# Patient Record
Sex: FEMALE | Race: WHITE
[De-identification: names, ages, dates, MRNs, and addresses within clinical notes are randomized per-mention and may not be internally consistent; named-entity substitution may affect disease eponyms.]

---

## 2017-06-22 ENCOUNTER — HOSPITAL ENCOUNTER (INPATIENT)
Dept: HOSPITAL 10 - OBT | Age: 32
LOS: 3 days | Discharge: HOME | End: 2017-06-25
Attending: OBSTETRICS & GYNECOLOGY | Admitting: OBSTETRICS & GYNECOLOGY
Payer: COMMERCIAL

## 2017-06-22 VITALS
HEIGHT: 62 IN | WEIGHT: 210.32 LBS | HEIGHT: 62 IN | WEIGHT: 210.32 LBS | BODY MASS INDEX: 38.7 KG/M2 | BODY MASS INDEX: 38.7 KG/M2

## 2017-06-22 VITALS — HEART RATE: 85 BPM | SYSTOLIC BLOOD PRESSURE: 118 MMHG | DIASTOLIC BLOOD PRESSURE: 65 MMHG

## 2017-06-22 DIAGNOSIS — Z3A.39: ICD-10-CM

## 2017-06-22 DIAGNOSIS — Z23: ICD-10-CM

## 2017-06-22 LAB
ADD SCAN DIFF: NO
APTT BLD: 27.3 SEC (ref 25–35)
BASOPHILS # BLD AUTO: 0 10^3/UL (ref 0–0.1)
BASOPHILS NFR BLD: 0.3 % (ref 0–2)
EOSINOPHIL # BLD: 0.1 10^3/UL (ref 0–0.5)
EOSINOPHIL NFR BLD: 0.6 % (ref 0–7)
ERYTHROCYTE [DISTWIDTH] IN BLOOD BY AUTOMATED COUNT: 14.8 % (ref 11.5–14.5)
HCT VFR BLD CALC: 36.5 % (ref 37–47)
HGB BLD-MCNC: 12.3 G/DL (ref 12–16)
INR PPP: 0.91
LYMPHOCYTES # BLD AUTO: 1.9 10^3/UL (ref 0.8–2.9)
LYMPHOCYTES NFR BLD AUTO: 18.4 % (ref 15–51)
MCH RBC QN AUTO: 30.3 PG (ref 29–33)
MCHC RBC AUTO-ENTMCNC: 33.7 G/DL (ref 32–37)
MCV RBC AUTO: 89.9 FL (ref 82–101)
MONOCYTES # BLD: 0.6 10^3/UL (ref 0.3–0.9)
MONOCYTES NFR BLD: 6.2 % (ref 0–11)
NEUTROPHILS # BLD: 7.5 10^3/UL (ref 1.6–7.5)
NEUTROPHILS NFR BLD AUTO: 73.3 % (ref 39–77)
NRBC # BLD MANUAL: 0 10^3/UL (ref 0–0)
NRBC BLD QL: 0 /100WBC (ref 0–0)
PLATELET # BLD: 223 10^3/UL (ref 140–415)
PMV BLD AUTO: 11.1 FL (ref 7.4–10.4)
PROTHROMBIN TIME: 12.3 SEC (ref 12.2–14.2)
PT RATIO: 1
RBC # BLD AUTO: 4.06 10^6/UL (ref 4.2–5.4)
WBC # BLD AUTO: 10.2 10^3/UL (ref 4.8–10.8)

## 2017-06-22 PROCEDURE — 85025 COMPLETE CBC W/AUTO DIFF WBC: CPT

## 2017-06-22 PROCEDURE — 90715 TDAP VACCINE 7 YRS/> IM: CPT

## 2017-06-22 PROCEDURE — 85610 PROTHROMBIN TIME: CPT

## 2017-06-22 PROCEDURE — 86900 BLOOD TYPING SEROLOGIC ABO: CPT

## 2017-06-22 PROCEDURE — 87340 HEPATITIS B SURFACE AG IA: CPT

## 2017-06-22 PROCEDURE — 86592 SYPHILIS TEST NON-TREP QUAL: CPT

## 2017-06-22 PROCEDURE — 86901 BLOOD TYPING SEROLOGIC RH(D): CPT

## 2017-06-22 PROCEDURE — 85730 THROMBOPLASTIN TIME PARTIAL: CPT

## 2017-06-22 PROCEDURE — G0463 HOSPITAL OUTPT CLINIC VISIT: HCPCS

## 2017-06-22 PROCEDURE — 62319: CPT

## 2017-06-22 RX ADMIN — PYRIDOXINE HYDROCHLORIDE SCH MLS/HR: 100 INJECTION, SOLUTION INTRAMUSCULAR; INTRAVENOUS at 11:51

## 2017-06-22 RX ADMIN — AMPICILLIN SODIUM SCH MLS/HR: 1 INJECTION, POWDER, FOR SOLUTION INTRAMUSCULAR; INTRAVENOUS at 20:40

## 2017-06-22 RX ADMIN — PYRIDOXINE HYDROCHLORIDE SCH MLS/HR: 100 INJECTION, SOLUTION INTRAMUSCULAR; INTRAVENOUS at 18:51

## 2017-06-22 RX ADMIN — AMPICILLIN SODIUM SCH MLS/HR: 1 INJECTION, POWDER, FOR SOLUTION INTRAMUSCULAR; INTRAVENOUS at 17:20

## 2017-06-22 NOTE — HP
Date/Time of Note


Date/Time of Note


DATE: 17 


TIME: 19:31





OB - History


Hx of Present Pregnancy


Chief Complaint:  contractions


Estimated Due Date:  2017


:  2


Para:  1


Spontaneous :  0


Therapeutic :  0


Prenatal Care:  Good Care


Ultrasounds:  Normal mid trimester US


Obstetrical Complications:  None


Medical Complications:  None





Past Family/Social History


*


Past Medical, Surgical, Family and Obstetric Histories reviewed from prenatal 

chart.


GBS Status:  Positive





OB  Admission Exam


Vital Signs


Vital Signs





 Vital Signs








  Date Time  Temp Pulse Resp B/P Pulse Ox O2 Delivery O2 Flow Rate FiO2


 


17 10:55 97.4 85  118/65    











Physical Exam


HEENT:  WNL


Heart:  Rhythm Normal


Lungs:  Clear


Abdomen:  WNL


Extremities:  Normal


Cervical Dilatation:  4cm


Effacement:  50%


Station:  -2


Membranes:  Intact


Fetal Heart Rate:  130's


Accelerations:  Accelerations Present


Decelerations:  No Decelerations


Varibility:  Moderate


Last 72 hours Lab Results


 CBC & BMP


17 11:38











OB  Assessment/Plan


Reason for admission:  active labor


Plan:  Expectant Management











YENI CABA MD 2017 19:33

## 2017-06-23 VITALS — SYSTOLIC BLOOD PRESSURE: 116 MMHG | DIASTOLIC BLOOD PRESSURE: 58 MMHG | RESPIRATION RATE: 18 BRPM | HEART RATE: 82 BPM

## 2017-06-23 RX ADMIN — PYRIDOXINE HYDROCHLORIDE SCH MLS/HR: 100 INJECTION, SOLUTION INTRAMUSCULAR; INTRAVENOUS at 17:23

## 2017-06-23 RX ADMIN — AMPICILLIN SODIUM SCH MLS/HR: 1 INJECTION, POWDER, FOR SOLUTION INTRAMUSCULAR; INTRAVENOUS at 05:15

## 2017-06-23 RX ADMIN — FENTANYL CIT 0.2 MG/100ML-ROPIV 0.2%-NACL 0.9% EPIDURAL INJ SCH ML: 2/0.2 SOLUTION at 02:40

## 2017-06-23 RX ADMIN — AMPICILLIN SODIUM SCH MLS/HR: 1 INJECTION, POWDER, FOR SOLUTION INTRAMUSCULAR; INTRAVENOUS at 13:22

## 2017-06-23 RX ADMIN — PYRIDOXINE HYDROCHLORIDE SCH MLS/HR: 100 INJECTION, SOLUTION INTRAMUSCULAR; INTRAVENOUS at 08:55

## 2017-06-23 RX ADMIN — PYRIDOXINE HYDROCHLORIDE SCH MLS/HR: 100 INJECTION, SOLUTION INTRAMUSCULAR; INTRAVENOUS at 22:17

## 2017-06-23 RX ADMIN — FENTANYL CIT 0.2 MG/100ML-ROPIV 0.2%-NACL 0.9% EPIDURAL INJ SCH ML: 2/0.2 SOLUTION at 13:32

## 2017-06-23 RX ADMIN — ACETAMINOPHEN AND CODEINE PHOSPHATE PRN TAB: 30; 300 TABLET ORAL at 23:01

## 2017-06-23 RX ADMIN — AMPICILLIN SODIUM SCH MLS/HR: 1 INJECTION, POWDER, FOR SOLUTION INTRAMUSCULAR; INTRAVENOUS at 17:24

## 2017-06-23 RX ADMIN — AMPICILLIN SODIUM SCH MLS/HR: 1 INJECTION, POWDER, FOR SOLUTION INTRAMUSCULAR; INTRAVENOUS at 08:54

## 2017-06-23 RX ADMIN — PYRIDOXINE HYDROCHLORIDE SCH MLS/HR: 100 INJECTION, SOLUTION INTRAMUSCULAR; INTRAVENOUS at 01:18

## 2017-06-23 RX ADMIN — AMPICILLIN SODIUM SCH MLS/HR: 1 INJECTION, POWDER, FOR SOLUTION INTRAMUSCULAR; INTRAVENOUS at 00:45

## 2017-06-23 NOTE — LDN
Date/Time of Note


Date/Time of Note


DATE: 17 


TIME: 20:47





Delivery Summary





Weeks of Gestation


39 weeks


Placenta Delivered:  Spontaneously


Meconium:  none


Episiotomy:  No


Perineal laceration:  0


Anesthesia type:  Epidural


Estimated blood loss:  200


Sponge & Needle done & correct:  Yes


All needle counts correct:  Yes


Any foreign bodies felt in the:  No


Problems:  





Infant Delivery Information


Sex


Infant Sex:  male





Apgars


1 Minute:  9


5 Minute:  9





Suctioning


Nose & mouth suctioned at eduardo:  Yes


Delee suction performed:  No





Umbilical Cord


Umbilical cord with:  3 Vessels


Cord presentations:  no nuchal cord


Cord Blood was obtained:  Yes





Mother & Baby Disposition


Disposition


Mom & Baby to Maternity; Good:  Yes











YENI CABA MD 2017 20:51

## 2017-06-24 VITALS — RESPIRATION RATE: 18 BRPM | SYSTOLIC BLOOD PRESSURE: 123 MMHG | HEART RATE: 81 BPM | DIASTOLIC BLOOD PRESSURE: 61 MMHG

## 2017-06-24 VITALS — DIASTOLIC BLOOD PRESSURE: 75 MMHG | HEART RATE: 74 BPM | SYSTOLIC BLOOD PRESSURE: 118 MMHG | RESPIRATION RATE: 18 BRPM

## 2017-06-24 VITALS — RESPIRATION RATE: 18 BRPM | HEART RATE: 89 BPM | SYSTOLIC BLOOD PRESSURE: 112 MMHG | DIASTOLIC BLOOD PRESSURE: 60 MMHG

## 2017-06-24 VITALS — DIASTOLIC BLOOD PRESSURE: 54 MMHG | HEART RATE: 71 BPM | SYSTOLIC BLOOD PRESSURE: 108 MMHG | RESPIRATION RATE: 18 BRPM

## 2017-06-24 LAB
ADD SCAN DIFF: NO
BASOPHILS # BLD AUTO: 0 10^3/UL (ref 0–0.1)
BASOPHILS NFR BLD: 0.2 % (ref 0–2)
EOSINOPHIL # BLD: 0 10^3/UL (ref 0–0.5)
EOSINOPHIL NFR BLD: 0.3 % (ref 0–7)
ERYTHROCYTE [DISTWIDTH] IN BLOOD BY AUTOMATED COUNT: 15.2 % (ref 11.5–14.5)
HCT VFR BLD CALC: 35 % (ref 37–47)
HGB BLD-MCNC: 11.4 G/DL (ref 12–16)
LYMPHOCYTES # BLD AUTO: 1.8 10^3/UL (ref 0.8–2.9)
LYMPHOCYTES NFR BLD AUTO: 14 % (ref 15–51)
MCH RBC QN AUTO: 29.8 PG (ref 29–33)
MCHC RBC AUTO-ENTMCNC: 32.6 G/DL (ref 32–37)
MCV RBC AUTO: 91.4 FL (ref 82–101)
MONOCYTES # BLD: 0.8 10^3/UL (ref 0.3–0.9)
MONOCYTES NFR BLD: 5.9 % (ref 0–11)
NEUTROPHILS # BLD: 10.3 10^3/UL (ref 1.6–7.5)
NEUTROPHILS NFR BLD AUTO: 79 % (ref 39–77)
NRBC # BLD MANUAL: 0 10^3/UL (ref 0–0)
NRBC BLD QL: 0 /100WBC (ref 0–0)
PLATELET # BLD: 212 10^3/UL (ref 140–415)
PMV BLD AUTO: 11.6 FL (ref 7.4–10.4)
RBC # BLD AUTO: 3.83 10^6/UL (ref 4.2–5.4)
WBC # BLD AUTO: 13 10^3/UL (ref 4.8–10.8)

## 2017-06-24 RX ADMIN — DOCUSATE SODIUM AND SENNOSIDES SCH TAB: 8.6; 5 TABLET, FILM COATED ORAL at 13:01

## 2017-06-24 RX ADMIN — IBUPROFEN SCH MG: 600 TABLET ORAL at 05:25

## 2017-06-24 RX ADMIN — IBUPROFEN SCH MG: 600 TABLET ORAL at 00:00

## 2017-06-24 RX ADMIN — DOCUSATE SODIUM AND SENNOSIDES SCH TAB: 8.6; 5 TABLET, FILM COATED ORAL at 21:00

## 2017-06-24 RX ADMIN — PYRIDOXINE HYDROCHLORIDE SCH MLS/HR: 100 INJECTION, SOLUTION INTRAMUSCULAR; INTRAVENOUS at 14:17

## 2017-06-24 RX ADMIN — PYRIDOXINE HYDROCHLORIDE SCH MLS/HR: 100 INJECTION, SOLUTION INTRAMUSCULAR; INTRAVENOUS at 06:17

## 2017-06-24 RX ADMIN — IBUPROFEN SCH MG: 600 TABLET ORAL at 18:22

## 2017-06-24 RX ADMIN — PYRIDOXINE HYDROCHLORIDE SCH MLS/HR: 100 INJECTION, SOLUTION INTRAMUSCULAR; INTRAVENOUS at 22:17

## 2017-06-24 RX ADMIN — ACETAMINOPHEN AND CODEINE PHOSPHATE PRN TAB: 30; 300 TABLET ORAL at 03:34

## 2017-06-24 RX ADMIN — IBUPROFEN SCH MG: 600 TABLET ORAL at 13:02

## 2017-06-24 RX ADMIN — IBUPROFEN SCH MG: 600 TABLET ORAL at 23:43

## 2017-06-24 RX ADMIN — DOCUSATE SODIUM AND SENNOSIDES SCH TAB: 8.6; 5 TABLET, FILM COATED ORAL at 10:34

## 2017-06-24 NOTE — QN
Documentation


Comment


No complaint


Afebrile VSS


Fundus Firm


Lochia Scant


PPD #1


Stable


Continue with present care.











YENI CABA MD Jun 24, 2017 15:33

## 2017-06-25 VITALS — HEART RATE: 72 BPM | DIASTOLIC BLOOD PRESSURE: 62 MMHG | RESPIRATION RATE: 20 BRPM | SYSTOLIC BLOOD PRESSURE: 124 MMHG

## 2017-06-25 VITALS — HEART RATE: 74 BPM | RESPIRATION RATE: 18 BRPM | DIASTOLIC BLOOD PRESSURE: 68 MMHG | SYSTOLIC BLOOD PRESSURE: 114 MMHG

## 2017-06-25 VITALS — HEART RATE: 70 BPM | DIASTOLIC BLOOD PRESSURE: 62 MMHG | SYSTOLIC BLOOD PRESSURE: 119 MMHG | RESPIRATION RATE: 18 BRPM

## 2017-06-25 PROCEDURE — 3E00X4Z INTRODUCTION OF SERUM, TOXOID AND VACCINE INTO SKIN AND MUCOUS MEMBRANES, EXTERNAL APPROACH: ICD-10-PCS | Performed by: OBSTETRICS & GYNECOLOGY

## 2017-06-25 RX ADMIN — DOCUSATE SODIUM AND SENNOSIDES SCH TAB: 8.6; 5 TABLET, FILM COATED ORAL at 10:01

## 2017-06-25 RX ADMIN — IBUPROFEN SCH MG: 600 TABLET ORAL at 12:38

## 2017-06-25 RX ADMIN — IBUPROFEN SCH MG: 600 TABLET ORAL at 05:23

## 2017-06-25 RX ADMIN — IBUPROFEN SCH MG: 600 TABLET ORAL at 18:00

## 2017-06-25 RX ADMIN — PYRIDOXINE HYDROCHLORIDE SCH MLS/HR: 100 INJECTION, SOLUTION INTRAMUSCULAR; INTRAVENOUS at 06:17

## 2017-06-25 NOTE — DS
Date/Time of Note


Date/Time of Note


DATE: 6/25/17 


TIME: 15:58





Obstetrical Discharge Record


Final Diagnosis


Final Diagnosis:  Term delivered





Vaginal Delivery


Obstetrical Delivery:  Spontaneous





Condition on Discharge


Physical Assessment


Voiding:  Yes


Bowel Movement:  Yes


Breast:  Soft, non-tender


Fundus:  Firm


Calf Tenderness:  No


Patient Condition:  Stable











YENI CABA MD Jun 25, 2017 15:59

## 2017-07-14 ENCOUNTER — HOSPITAL ENCOUNTER (EMERGENCY)
Dept: HOSPITAL 10 - FTE | Age: 32
Discharge: HOME | End: 2017-07-14
Payer: COMMERCIAL

## 2017-07-14 VITALS
WEIGHT: 196.21 LBS | HEIGHT: 67 IN | WEIGHT: 196.21 LBS | BODY MASS INDEX: 30.8 KG/M2 | BODY MASS INDEX: 30.8 KG/M2 | HEIGHT: 67 IN

## 2017-07-14 DIAGNOSIS — R10.2: ICD-10-CM

## 2017-07-14 DIAGNOSIS — N39.0: Primary | ICD-10-CM

## 2017-07-14 LAB
ADD SCAN DIFF: NO
ADD UMIC: YES
ALBUMIN SERPL-MCNC: 4.7 G/DL (ref 3.3–4.9)
ALBUMIN/GLOB SERPL: 1.46 {RATIO}
ALP SERPL-CCNC: 140 IU/L (ref 42–121)
ALT SERPL-CCNC: 91 IU/L (ref 13–69)
ANION GAP SERPL CALC-SCNC: 21 MMOL/L (ref 8–16)
AST SERPL-CCNC: 48 IU/L (ref 15–46)
BACTERIA #/AREA URNS HPF: (no result) /HPF
BASOPHILS # BLD AUTO: 0.1 10^3/UL (ref 0–0.1)
BASOPHILS NFR BLD: 0.4 % (ref 0–2)
BILIRUB DIRECT SERPL-MCNC: 0 MG/DL (ref 0–0.2)
BILIRUB SERPL-MCNC: 0.2 MG/DL (ref 0.2–1.3)
BUN SERPL-MCNC: 14 MG/DL (ref 7–20)
CALCIUM SERPL-MCNC: 9.7 MG/DL (ref 8.4–10.2)
CHLORIDE SERPL-SCNC: 104 MMOL/L (ref 97–110)
CO2 SERPL-SCNC: 23 MMOL/L (ref 21–31)
COLOR UR: YELLOW
CREAT SERPL-MCNC: 0.83 MG/DL (ref 0.44–1)
EOSINOPHIL # BLD: 0.1 10^3/UL (ref 0–0.5)
EOSINOPHIL NFR BLD: 0.4 % (ref 0–7)
ERYTHROCYTE [DISTWIDTH] IN BLOOD BY AUTOMATED COUNT: 13.9 % (ref 11.5–14.5)
GLOBULIN SER-MCNC: 3.2 G/DL (ref 1.3–3.2)
GLUCOSE SERPL-MCNC: 109 MG/DL (ref 70–220)
GLUCOSE UR STRIP-MCNC: NEGATIVE MG/DL
HCT VFR BLD CALC: 42.3 % (ref 37–47)
HGB BLD-MCNC: 14.1 G/DL (ref 12–16)
KETONES UR STRIP.AUTO-MCNC: NEGATIVE MG/DL
LYMPHOCYTES # BLD AUTO: 2 10^3/UL (ref 0.8–2.9)
LYMPHOCYTES NFR BLD AUTO: 15.1 % (ref 15–51)
MCH RBC QN AUTO: 30.1 PG (ref 29–33)
MCHC RBC AUTO-ENTMCNC: 33.3 G/DL (ref 32–37)
MCV RBC AUTO: 90.2 FL (ref 82–101)
MONOCYTES # BLD: 0.6 10^3/UL (ref 0.3–0.9)
MONOCYTES NFR BLD: 4.9 % (ref 0–11)
MUCOUS THREADS #/AREA URNS HPF: (no result) /HPF
NEUTROPHILS # BLD: 10.2 10^3/UL (ref 1.6–7.5)
NEUTROPHILS NFR BLD AUTO: 78.8 % (ref 39–77)
NITRITE UR QL STRIP.AUTO: NEGATIVE MG/DL
NRBC # BLD MANUAL: 0 10^3/UL (ref 0–0)
NRBC BLD QL: 0 /100WBC (ref 0–0)
PLATELET # BLD: 238 10^3/UL (ref 140–415)
PMV BLD AUTO: 11.8 FL (ref 7.4–10.4)
POTASSIUM SERPL-SCNC: 4.1 MMOL/L (ref 3.5–5.1)
PROT SERPL-MCNC: 7.9 G/DL (ref 6.1–8.1)
RBC # BLD AUTO: 4.69 10^6/UL (ref 4.2–5.4)
RBC # UR AUTO: (no result) MG/DL
SODIUM SERPL-SCNC: 144 MMOL/L (ref 135–144)
SQUAMOUS #/AREA URNS HPF: (no result) /HPF
UR ASCORBIC ACID: NEGATIVE MG/DL
UR BILIRUBIN (DIP): NEGATIVE MG/DL
UR CLARITY: (no result)
UR NONSQUAMOUS EPITHELIAL CELL: 4 /HPF
UR PH (DIP): 5 (ref 5–9)
UR RBC: > 182 /HPF (ref 0–5)
UR SPECIFIC GRAVITY (DIP): 1.02 (ref 1–1.03)
UR TOTAL PROTEIN (DIP): (no result) MG/DL
UR WBC CLUMPS: (no result) /HPF
UROBILINOGEN UR STRIP-ACNC: NEGATIVE MG/DL
WBC # BLD AUTO: 13 10^3/UL (ref 4.8–10.8)
WBC # UR STRIP: (no result) LEU/UL

## 2017-07-14 PROCEDURE — 85025 COMPLETE CBC W/AUTO DIFF WBC: CPT

## 2017-07-14 PROCEDURE — 76830 TRANSVAGINAL US NON-OB: CPT

## 2017-07-14 PROCEDURE — 80053 COMPREHEN METABOLIC PANEL: CPT

## 2017-07-14 PROCEDURE — 76856 US EXAM PELVIC COMPLETE: CPT

## 2017-07-14 PROCEDURE — 83690 ASSAY OF LIPASE: CPT

## 2017-07-14 PROCEDURE — 36415 COLL VENOUS BLD VENIPUNCTURE: CPT

## 2017-07-14 PROCEDURE — 81001 URINALYSIS AUTO W/SCOPE: CPT

## 2017-07-14 NOTE — RADRPT
PROCEDURE:   US Pelvis. 

 

CLINICAL INDICATION:   Left pelvic pain. 

 

TECHNIQUE:   The pelvis was evaluated with transabdominal and transvaginal sonography in the axial a
nd sagittal planes. 

 

COMPARISON:   No prior study is available for comparison.  

 

FINDINGS:

Uterus: 10.3 x 5.7 x 7.1 cm.

Endometrium: 5.6 mm. A small amount of fluid is present in the endometrial canal

Right ovary: 3.2 x 2.2 x 2.1 cm.

Left ovary: 2.9 x 1.0 x 2.0 cm.

 

Uterine masses: None.  

Ovarian masses: None. Color Doppler and pulsed Doppler sonography demonstrate normal flow to the ova
rohit.  

Other pelvic masses: None. 

Free fluid: None.   

 

IMPRESSION:

1.  Small amount of fluid in the endometrial canal.  Benign.  Follow-up advised.

2.  Otherwise normal pelvic ultrasound.  

 

RPTAT: QQ

_____________________________________________ 

.Dion Woods MD, MD           Date    Time 

Electronically viewed and signed by .Dion Woods MD, MD on 07/14/2017 15:17 

 

D:  07/14/2017 15:17  T:  07/14/2017 15:17

.R/

## 2017-07-14 NOTE — ERD
ER Documentation


Chief Complaint


Date/Time


DATE: 17 


TIME: 16:26


Chief Complaint


intermittent left side abd pain with nausea x this am





HPI


31-year-old female patient who is a  presents to the ED with left-sided 

upper abdominal pain and feels like it travels to her "vagina".  States that 

this happened yesterday.  Reports that she just vaginally delivered her baby 3 

weeks ago by Dr. Mason.  States that she has been having consistent vaginal 

bleeding and has been changing 3-4 pads per day.  Patient does describe this 

pain as a burning sensation.  Denies any weakness, fatigue, dizziness, nausea, 

vomiting, diarrhea, constipation, vaginal discharge, dysuria, urgency, 

frequency.





ROS


All systems reviewed and are negative except as per history of present illness.





Medications


Home Meds


Active Scripts


Cephalexin* (Keflex*) 500 Mg Capsule, 500 MG PO QID for 7 Days, CAP


   Prov:MAGY DIAZ PA-C         17


Reported Medications


Multivit/Min/Fol Ac/Iron/Pren* (Prenatal S*) 1 Tab Tab, 1 TAB PO DAILY, TAB


   17





Allergies


Allergies:  


Coded Allergies:  


     No Known Allergy (Unverified , 17)





PMhx/Soc


Medical and Surgical Hx:  pt denies Medical Hx, pt denies Surgical Hx


History of Surgery:  No


Anesthesia Reaction:  No


Hx Neurological Disorder:  No


Hx Respiratory Disorders:  No


Hx Cardiac Disorders:  No


Hx Psychiatric Problems:  No


Hx Alcohol Use:  No


Hx Substance Use:  No


Hx Tobacco Use:  No


Smoking Status:  Never smoker





Physical Exam


Vitals


Vital Signs








  Date Time  Temp Pulse Resp B/P Pulse Ox O2 Delivery O2 Flow Rate FiO2


 


17 13:09 97.4 86 18 130/81 98   








Physical Exam


Const: Non-ill-appearing, well-nourished. In no acute distress.


Head: Atraumatic, normocephalic 


Eyes: Normal Conjunctiva without injection. No purulent discharge. 


ENT: Normal external ear, nose. Moist oropharynx without tonsillar exudates. Non

-erythematous pharynx. Uvula midline. No drooling.  No trismus. 


Neck: No cervical midline tenderness.  Full range of motion. No meningismus. No 

cervical lymphadenopathy. No JVD.


Resp: Clear to auscultation bilaterally. No wheezing, rhonchi, rales, or 

crackles. No accessory muscle use. No retractions.


Cardio: Regular rate and rhythm.  No murmurs, rubs or gallops.


Abd: Soft, left upper quadrant tenderness, non distended. Normal bowel sounds.  

No palpable masses.  No rebound tenderness.  No guarding.  Negative McBurney's 

point. Negative psoas sign. Negative obturator sign.


Skin: No petechiae or rashes


Back: No midline tenderness. No CVA tenderness.


Ext: No cyanosis, or edema.


Neur: Awake and alert. Normal gait. Normal coordination. 


Psych: Normal Mood and Affect


Results 24 hrs





 Laboratory Tests








Test


  17


14:19


 


White Blood Count 13.010^3/ul 


 


Red Blood Count 4.6910^6/ul 


 


Hemoglobin 14.1g/dl 


 


Hematocrit 42.3% 


 


Mean Corpuscular Volume 90.2fl 


 


Mean Corpuscular Hemoglobin 30.1pg 


 


Mean Corpuscular Hemoglobin


Concent 33.3g/dl 


 


 


Red Cell Distribution Width 13.9% 


 


Platelet Count 71044^3/UL 


 


Mean Platelet Volume 11.8fl 


 


Neutrophils % 78.8% 


 


Lymphocytes % 15.1% 


 


Monocytes % 4.9% 


 


Eosinophils % 0.4% 


 


Basophils % 0.4% 


 


Nucleated Red Blood Cells % 0.0/100WBC 


 


Neutrophils # 10.210^3/ul 


 


Lymphocytes # 2.010^3/ul 


 


Monocytes # 0.610^3/ul 


 


Eosinophils # 0.110^3/ul 


 


Basophils # 0.110^3/ul 


 


Nucleated Red Blood Cells # 0.010^3/ul 


 


Urine Color YELLOW 


 


Urine Clarity CLOUDY 


 


Urine pH 5.0 


 


Urine Specific Gravity 1.025 


 


Urine Ketones NEGATIVEmg/dL 


 


Urine Nitrite NEGATIVEmg/dL 


 


Urine Bilirubin NEGATIVEmg/dL 


 


Urine Urobilinogen NEGATIVEmg/dL 


 


Urine Leukocyte Esterase 3+Ryne/ul 


 


Urine Microscopic RBC > 182/HPF 


 


Urine Microscopic WBC > 182/HPF 


 


Urine Squamous Epithelial


Cells FEW/HPF 


 


 


Urine Bacteria FEW/HPF 


 


Urine Mucus FEW/HPF 


 


Urine Hemoglobin 3+mg/dL 


 


Urine Glucose NEGATIVEmg/dL 


 


Urine Total Protein 2+mg/dl 


 


Sodium Level 144mmol/L 


 


Potassium Level 4.1mmol/L 


 


Chloride Level 104mmol/L 


 


Carbon Dioxide Level 23mmol/L 


 


Anion Gap 21 


 


Blood Urea Nitrogen 14mg/dl 


 


Creatinine 0.83mg/dl 


 


Glucose Level 109mg/dl 


 


Calcium Level 9.7mg/dl 


 


Total Bilirubin 0.2mg/dl 


 


Direct Bilirubin 0.00mg/dl 


 


Indirect Bilirubin 0.2mg/dl 


 


Aspartate Amino Transf


(AST/SGOT) 48IU/L 


 


 


Alanine Aminotransferase


(ALT/SGPT) 91IU/L 


 


 


Alkaline Phosphatase 140IU/L 


 


Total Protein 7.9g/dl 


 


Albumin 4.7g/dl 


 


Globulin 3.20g/dl 


 


Albumin/Globulin Ratio 1.46 


 


Lipase 103U/L 








 Current Medications








 Medications


  (Trade)  Dose


 Ordered  Sig/Shweta


 Route


 PRN Reason  Start Time


 Stop Time Status Last Admin


Dose Admin


 


 Ondansetron HCl


  (Zofran Odt)  4 mg  ONCE  STAT


 ODT


   17 14:00


 17 14:02 DC 17 14:25


 


 


 Acetaminophen


  (Tylenol Tab)  500 mg  ONCE  STAT


 PO


   17 15:56


 17 15:57 DC 17 16:13


 











Procedures/MDM


31-year-old female patient with no significant past medical history presents to 

the ED complaining of left sided upper abdominal pain.  Patient is afebrile and 

nontoxic-appearing.  Patient has normal vital signs.  Patient was further 

worked up with CBC, CMP, lipase, UA, urine pregnancy, pelvic ultrasound. Patient

's pain and symptoms have improved after treatment with Tylenol.





CBC:  No leukocytosis. No e/o of systemic infection. No e/o anemia.


CMP: No e/o severe acidosis, alkalosis, renal failure, diabetic ketoacidosis, 

liver disease


Lipase within normal limits.


Urine: No leukocyte esterase, no nitrites, no hematuria. 


Urine pregnancy: Negative





PROCEDURE:   US Pelvis. 


 


CLINICAL INDICATION:   Left pelvic pain. 


 


TECHNIQUE:   The pelvis was evaluated with transabdominal and transvaginal 

sonography in the axial and sagittal planes. 


 


COMPARISON:   No prior study is available for comparison.  


 


FINDINGS:


Uterus: 10.3 x 5.7 x 7.1 cm.


Endometrium: 5.6 mm. A small amount of fluid is present in the endometrial canal


Right ovary: 3.2 x 2.2 x 2.1 cm.


Left ovary: 2.9 x 1.0 x 2.0 cm.


 


Uterine masses: None.  


Ovarian masses: None. Color Doppler and pulsed Doppler sonography demonstrate 

normal flow to the ovaries.  


Other pelvic masses: None. 


Free fluid: None.   


 


IMPRESSION:


1.  Small amount of fluid in the endometrial canal.  Benign.  Follow-up advised.


2.  Otherwise normal pelvic ultrasound.  


 


Patient has 3+ leukocyte esterase, 182 white blood cells, 3+ hematuria.  

Patient likely has urinary tract infection.  Patient does not have 

hypertension.  Slight liver enzymes are elevated however there is low suspicion 

for any HELLP, postpartum preeclampsia, gastritis, GERD, peptic ulcer disease, 

cholecystitis, choledocholithiasis, cholangitis, pancreatitis, appendicitis, 

bowel obstruction, ileus, volvulus, nephrolithiasis, pyelonephritis, hepatitis, 

perforated viscus, diverticulitis, abdominal hernia, acute abdomen, mesenteric 

ischemia or other emergent conditions.  This case was discussed with my 

supervising physician Dr. Tenorio who agreed with the management and 

discharge plan. 





Discharge medications: Keflex


Follow up with primary care physician in 1-2 days for referral to 

gastroenterologist. Instructed patient to return to the ED sooner for any 

worsening symptoms. Patient's questions were answered. Patient understood and 

agreed with discharge plan. Patient discharged stable.





Departure


Diagnosis:  


 Primary Impression:  


 Urinary tract infection


 Urinary tract infection type:  site unspecified  Hematuria presence:  without 

hematuria  Qualified Code:  N39.0 - Urinary tract infection without hematuria, 

site unspecified


Condition:  Stable


Patient Instructions:  Abdominal Pain, Understanding Urinary Tract Infections (

UTIs)


Referrals:  


[facility]


YOU HAVE RECEIVED A MEDICAL SCREENING EXAM AND THE RESULTS INDICATE THAT YOU DO 

NOT HAVE A CONDITION THAT REQUIRES URGENT TREATMENT IN THE EMERGENCY DEPARTMENT.





FURTHER EVALUATION AND TREATMENT OF YOUR CONDITION CAN WAIT UNTIL YOU ARE SEEN 

IN YOUR DOCTORS OFFICE WITHIN THE NEXT 1-2 DAYS. IT IS YOUR RESPONSIBILITY TO 

MAKE AN APPOINTMENT FOR FOLOW-UP CARE.





IF YOU HAVE A PRIMARY DOCTOR


--you should call your primary doctor and schedule an appointment





IF YOU DO NOT HAVE A PRIMARY DOCTOR YOU CAN CALL OUR PHYSICIAN REFERRAL HOTLINE 

AT


 [phone] 





IF YOU CAN NOT AFFORD TO SEE A PHYSICIAN YOU CAN CHOSE FROM THE FOLLOWING 

[facility]





[facility] [phone] [address] [facility] [phone] [address] [facility]. [facility] [phone] [address] [facility] [phone] [address] [facility]. [facility] [phone] [address] [facility]. [facility]. [phone]


[address] [facility]. [facility]


YOU HAVE RECEIVED A MEDICAL SCREENING EXAM AND THE RESULTS INDICATE THAT YOU DO 

NOT HAVE A CONDITION THAT REQUIRES URGENT TREATMENT IN THE EMERGENCY DEPARTMENT.





FURTHER EVALUATION AND TREATMENT OF YOUR CONDITION CAN WAIT UNTIL YOU ARE SEEN 

IN YOUR DOCTORS OFFICE WITHIN THE NEXT 1-2 DAYS. IT IS YOUR RESPONSIBILITY TO 

MAKE AN APPOINTMENT FOR FOLOW-UP CARE.





IF YOU HAVE A PRIMARY DOCTOR


--you should call your primary doctor and schedule and appointment





IF YOU DO NOT HAVE A PRIMARY DOCTOR YOU CAN CALL OUR PHYSICIAN REFERRAL HOTLINE 

AT [phone].





IF YOU CAN NOT AFFORD TO SEE A PHYSICIAN YOU CAN CHOSE FROM THE FOLLOWING 

[facility] INSTITUTIONS:





[facility]


[address] [place] [address]





[facility]


[address] [place], [place] [id number] [place] [address]


[address] [place] [address]





[facility] URGENT CARE/SPECIALTIES





Additional Instructions:  


Call your primary care doctor for an appointment during the next 2 days for 

further evaluation and treatment.See the doctor sooner or return here if your 

condition worsens before your appointment time.











MAGY DIAZ PA-C 2017 16:29











MAGY DIAZ PA-C 2017 16:29

## 2018-08-24 ENCOUNTER — HOSPITAL ENCOUNTER (INPATIENT)
Dept: HOSPITAL 91 - OBT | Age: 33
LOS: 2 days | Discharge: HOME | End: 2018-08-26
Payer: COMMERCIAL

## 2018-08-24 ENCOUNTER — HOSPITAL ENCOUNTER (INPATIENT)
Age: 33
LOS: 2 days | Discharge: HOME | End: 2018-08-26

## 2018-08-24 DIAGNOSIS — Z3A.38: ICD-10-CM

## 2018-08-24 LAB
ADD MAN DIFF?: NO
BASOPHIL #: 0 10^3/UL (ref 0–0.1)
BASOPHILS %: 0.2 % (ref 0–2)
EOSINOPHILS #: 0.1 10^3/UL (ref 0–0.5)
EOSINOPHILS %: 0.9 % (ref 0–7)
HEMATOCRIT: 38.8 % (ref 37–47)
HEMOGLOBIN: 12.8 G/DL (ref 12–16)
HEPATITIS B SURFACE ANTIGEN: NEGATIVE
IMMATURE GRANS #M: 0.09 10^3/UL (ref 0–0.03)
IMMATURE GRANS % (M): 0.9 % (ref 0–0.43)
INR: 0.87
LYMPHOCYTES #: 2.5 10^3/UL (ref 0.8–2.9)
LYMPHOCYTES %: 25.3 % (ref 15–51)
MEAN CORPUSCULAR HEMOGLOBIN: 30 PG (ref 29–33)
MEAN CORPUSCULAR HGB CONC: 33 G/DL (ref 32–37)
MEAN CORPUSCULAR VOLUME: 90.9 FL (ref 82–101)
MEAN PLATELET VOLUME: 11.1 FL (ref 7.4–10.4)
MONOCYTE #: 0.8 10^3/UL (ref 0.3–0.9)
MONOCYTES %: 7.8 % (ref 0–11)
NEUTROPHIL #: 6.4 10^3/UL (ref 1.6–7.5)
NEUTROPHILS %: 64.9 % (ref 39–77)
NUCLEATED RED BLOOD CELLS #: 0 10^3/UL (ref 0–0)
NUCLEATED RED BLOOD CELLS%: 0 /100WBC (ref 0–0)
PARTIAL THROMBOPLASTIN TIME: 29.1 SEC (ref 25–35)
PLATELET COUNT: 209 10^3/UL (ref 140–415)
PROTIME: 11.9 SEC (ref 11.9–14.9)
PT RATIO: 0.9
RED BLOOD COUNT: 4.27 10^6/UL (ref 4.2–5.4)
RED CELL DISTRIBUTION WIDTH: 14.8 % (ref 11.5–14.5)
WHITE BLOOD COUNT: 9.8 10^3/UL (ref 4.8–10.8)

## 2018-08-24 PROCEDURE — 86900 BLOOD TYPING SEROLOGIC ABO: CPT

## 2018-08-24 PROCEDURE — 85730 THROMBOPLASTIN TIME PARTIAL: CPT

## 2018-08-24 PROCEDURE — 86901 BLOOD TYPING SEROLOGIC RH(D): CPT

## 2018-08-24 PROCEDURE — 86850 RBC ANTIBODY SCREEN: CPT

## 2018-08-24 PROCEDURE — 85610 PROTHROMBIN TIME: CPT

## 2018-08-24 PROCEDURE — 86592 SYPHILIS TEST NON-TREP QUAL: CPT

## 2018-08-24 PROCEDURE — 87340 HEPATITIS B SURFACE AG IA: CPT

## 2018-08-24 PROCEDURE — 85025 COMPLETE CBC W/AUTO DIFF WBC: CPT

## 2018-08-24 RX ADMIN — Medication 1 MLS/HR: at 22:04

## 2018-08-24 RX ADMIN — AMPICILLIN 1 MLS/HR: 2 INJECTION, POWDER, FOR SOLUTION INTRAVENOUS at 21:06

## 2018-08-24 RX ADMIN — PYRIDOXINE HYDROCHLORIDE 1 MLS/HR: 100 INJECTION, SOLUTION INTRAMUSCULAR; INTRAVENOUS at 21:06

## 2018-08-24 RX ADMIN — Medication 1 MLS/HR: at 22:03

## 2018-08-24 RX ADMIN — BUTORPHANOL TARTRATE 1 MG: 2 INJECTION, SOLUTION INTRAMUSCULAR; INTRAVENOUS at 21:12

## 2018-08-24 RX ADMIN — Medication 1 MLS/HR: at 23:53

## 2018-08-25 LAB
ADD MAN DIFF?: NO
BASOPHIL #: 0 10^3/UL (ref 0–0.1)
BASOPHILS %: 0.3 % (ref 0–2)
EOSINOPHILS #: 0.1 10^3/UL (ref 0–0.5)
EOSINOPHILS %: 0.6 % (ref 0–7)
HEMATOCRIT: 35.5 % (ref 37–47)
HEMOGLOBIN: 11.6 G/DL (ref 12–16)
IMMATURE GRANS #M: 0.07 10^3/UL (ref 0–0.03)
IMMATURE GRANS % (M): 0.6 % (ref 0–0.43)
LYMPHOCYTES #: 2 10^3/UL (ref 0.8–2.9)
LYMPHOCYTES %: 18 % (ref 15–51)
MEAN CORPUSCULAR HEMOGLOBIN: 30.2 PG (ref 29–33)
MEAN CORPUSCULAR HGB CONC: 32.7 G/DL (ref 32–37)
MEAN CORPUSCULAR VOLUME: 92.4 FL (ref 82–101)
MEAN PLATELET VOLUME: 11.1 FL (ref 7.4–10.4)
MONOCYTE #: 0.6 10^3/UL (ref 0.3–0.9)
MONOCYTES %: 5.5 % (ref 0–11)
NEUTROPHIL #: 8.4 10^3/UL (ref 1.6–7.5)
NEUTROPHILS %: 75 % (ref 39–77)
NUCLEATED RED BLOOD CELLS #: 0 10^3/UL (ref 0–0)
NUCLEATED RED BLOOD CELLS%: 0 /100WBC (ref 0–0)
PLATELET COUNT: 206 10^3/UL (ref 140–415)
RAPID PLASMA REAGIN: NONREACTIVE
RED BLOOD COUNT: 3.84 10^6/UL (ref 4.2–5.4)
RED CELL DISTRIBUTION WIDTH: 15 % (ref 11.5–14.5)
WHITE BLOOD COUNT: 11.2 10^3/UL (ref 4.8–10.8)

## 2018-08-25 RX ADMIN — IBUPROFEN 1 MG: 600 TABLET ORAL at 23:42

## 2018-08-25 RX ADMIN — DOCUSATE SODIUM AND SENNOSIDES 1 TAB: 8.6; 5 TABLET, FILM COATED ORAL at 21:00

## 2018-08-25 RX ADMIN — PYRIDOXINE HYDROCHLORIDE 1 MLS/HR: 100 INJECTION, SOLUTION INTRAMUSCULAR; INTRAVENOUS at 03:10

## 2018-08-25 RX ADMIN — Medication 1 APPLIC: at 04:25

## 2018-08-25 RX ADMIN — IBUPROFEN 1 MG: 600 TABLET ORAL at 18:11

## 2018-08-25 RX ADMIN — IBUPROFEN 1 MG: 600 TABLET ORAL at 13:26

## 2018-08-25 RX ADMIN — DOCUSATE SODIUM AND SENNOSIDES 1 TAB: 8.6; 5 TABLET, FILM COATED ORAL at 10:20

## 2018-08-25 RX ADMIN — IBUPROFEN 1 MG: 600 TABLET ORAL at 06:00

## 2018-08-25 RX ADMIN — IBUPROFEN 1 MG: 600 TABLET ORAL at 04:25

## 2018-08-25 RX ADMIN — PYRIDOXINE HYDROCHLORIDE 1 MLS/HR: 100 INJECTION, SOLUTION INTRAMUSCULAR; INTRAVENOUS at 05:00

## 2018-08-25 RX ADMIN — WITCH HAZEL 1 PAD: 500 SOLUTION RECTAL; TOPICAL at 04:25

## 2018-08-26 RX ADMIN — DOCUSATE SODIUM AND SENNOSIDES 1 TAB: 8.6; 5 TABLET, FILM COATED ORAL at 09:13

## 2018-08-26 RX ADMIN — MEASLES, MUMPS, AND RUBELLA VIRUS VACCINE LIVE 1 ML: 1000; 12500; 1000 INJECTION, POWDER, LYOPHILIZED, FOR SUSPENSION SUBCUTANEOUS at 09:00

## 2018-08-26 RX ADMIN — VARICELLA VIRUS VACCINE LIVE 1 UNIT: 1350 INJECTION, POWDER, LYOPHILIZED, FOR SUSPENSION SUBCUTANEOUS at 09:00

## 2018-08-26 RX ADMIN — CLOSTRIDIUM TETANI TOXOID ANTIGEN (FORMALDEHYDE INACTIVATED), CORYNEBACTERIUM DIPHTHERIAE TOXOID ANTIGEN (FORMALDEHYDE INACTIVATED), BORDETELLA PERTUSSIS TOXOID ANTIGEN (GLUTARALDEHYDE INACTIVATED), BORDETELLA PERTUSSIS FILAMENTOUS HEMAGGLUTININ ANTIGEN (FORMALDEHYDE INACTIVATED), BORDETELLA PERTUSSIS PERTACTIN ANTIGEN, AND BORDETELLA PERTUSSIS FIMBRIAE 2/3 ANTIGEN 1 ML: 5; 2; 2.5; 5; 3; 5 INJECTION, SUSPENSION INTRAMUSCULAR at 09:00

## 2018-08-26 RX ADMIN — IBUPROFEN 1 MG: 600 TABLET ORAL at 12:31

## 2018-08-26 RX ADMIN — IBUPROFEN 1 MG: 600 TABLET ORAL at 17:26

## 2018-08-26 RX ADMIN — IBUPROFEN 1 MG: 600 TABLET ORAL at 05:30
